# Patient Record
Sex: FEMALE
[De-identification: names, ages, dates, MRNs, and addresses within clinical notes are randomized per-mention and may not be internally consistent; named-entity substitution may affect disease eponyms.]

---

## 2017-12-05 ENCOUNTER — HOSPITAL ENCOUNTER (OUTPATIENT)
Dept: HOSPITAL 92 - BICMAMMO | Age: 67
Discharge: HOME | End: 2017-12-05
Attending: FAMILY MEDICINE
Payer: MEDICARE

## 2017-12-05 DIAGNOSIS — Z12.31: Primary | ICD-10-CM

## 2017-12-05 PROCEDURE — 77063 BREAST TOMOSYNTHESIS BI: CPT

## 2018-02-15 ENCOUNTER — HOSPITAL ENCOUNTER (OUTPATIENT)
Dept: HOSPITAL 92 - ULT | Age: 68
Discharge: HOME | End: 2018-02-15
Attending: PHYSICIAN ASSISTANT
Payer: MEDICARE

## 2018-02-15 DIAGNOSIS — R19.03: Primary | ICD-10-CM

## 2018-02-15 PROCEDURE — 76705 ECHO EXAM OF ABDOMEN: CPT

## 2018-02-15 NOTE — ULT
ABDOMINAL WALL SOFT TISSUE ULTRASOUND:

 

Date:  02/15/18 

 

INDICATION:

Palpable abnormality of lower abdominal wall ventrally. 

 

FINDINGS:

There is a circumscribed ovoid-shaped structure which is isoechoic to subcutaneous fat and does demon
strate an external capsule. No significant internal flow. Finding measures greater than 4.0 cm in gre
atest dimension x approximately 1.9 cm in its transverse dimension. 

 

IMPRESSION: 

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 Correlate cli
nically. Further assessment, as this finding is difficult to measure in its entirety as it extends be
yond the scope of the ultrasound transducer, may be performed with CT exam as necessary. 

 

 

POS: LYNN